# Patient Record
Sex: MALE | Race: BLACK OR AFRICAN AMERICAN | NOT HISPANIC OR LATINO | Employment: UNEMPLOYED | URBAN - METROPOLITAN AREA
[De-identification: names, ages, dates, MRNs, and addresses within clinical notes are randomized per-mention and may not be internally consistent; named-entity substitution may affect disease eponyms.]

---
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
## 2017-08-13 PROBLEM — D69.59 PLATELET DYSFUNCTION DUE TO DRUGS: Status: ACTIVE | Noted: 2017-08-13

## 2017-08-13 PROBLEM — T50.905A PLATELET DYSFUNCTION DUE TO DRUGS: Status: ACTIVE | Noted: 2017-08-13

## 2017-08-13 PROBLEM — I25.10 CAD (CORONARY ARTERY DISEASE): Status: ACTIVE | Noted: 2017-08-13

## 2017-08-15 PROBLEM — F10.10 ETOH ABUSE: Status: ACTIVE | Noted: 2017-08-15

## 2017-08-15 PROBLEM — S22.008A CLOSED FRACTURE OF SPINOUS PROCESS OF THORACIC VERTEBRA: Status: ACTIVE | Noted: 2017-08-15

## 2017-08-15 PROBLEM — F19.10 SUBSTANCE ABUSE: Status: ACTIVE | Noted: 2017-08-15

## 2017-08-17 PROBLEM — I10 HTN (HYPERTENSION): Status: ACTIVE | Noted: 2017-08-17

## 2017-08-17 PROBLEM — I25.2 H/O ACUTE MYOCARDIAL INFARCTION: Status: ACTIVE | Noted: 2017-08-17

## 2018-07-14 PROBLEM — I73.9 PAD (PERIPHERAL ARTERY DISEASE): Status: ACTIVE | Noted: 2018-07-14

## 2018-07-14 PROBLEM — R07.9 CHEST PAIN: Status: ACTIVE | Noted: 2018-07-14

## 2018-07-14 PROBLEM — M79.604 BILATERAL LEG PAIN: Status: ACTIVE | Noted: 2018-07-14

## 2018-07-14 PROBLEM — M79.605 BILATERAL LEG PAIN: Status: ACTIVE | Noted: 2018-07-14

## 2018-07-15 PROBLEM — I73.9 PVD (PERIPHERAL VASCULAR DISEASE) WITH CLAUDICATION: Status: ACTIVE | Noted: 2018-07-15

## 2018-07-15 PROBLEM — Z72.0 TOBACCO ABUSE: Status: ACTIVE | Noted: 2018-07-15

## 2018-07-15 PROBLEM — E78.5 HYPERLIPIDEMIA: Status: ACTIVE | Noted: 2018-07-15

## 2018-07-20 PROBLEM — L03.115 CELLULITIS OF RIGHT LOWER EXTREMITY WITHOUT FOOT: Status: ACTIVE | Noted: 2018-07-20

## 2018-07-21 PROBLEM — T14.8XXA HEMATOMA: Status: ACTIVE | Noted: 2018-07-21

## 2018-07-22 PROBLEM — R07.9 CHEST PAIN: Status: RESOLVED | Noted: 2018-07-14 | Resolved: 2018-07-22

## 2018-07-22 PROBLEM — I73.9 PAD (PERIPHERAL ARTERY DISEASE): Status: RESOLVED | Noted: 2018-07-14 | Resolved: 2018-07-22

## 2018-07-22 PROBLEM — M79.604 BILATERAL LEG PAIN: Status: RESOLVED | Noted: 2018-07-14 | Resolved: 2018-07-22

## 2018-07-22 PROBLEM — E78.5 HYPERLIPIDEMIA: Status: RESOLVED | Noted: 2018-07-15 | Resolved: 2018-07-22

## 2018-07-22 PROBLEM — M79.605 BILATERAL LEG PAIN: Status: RESOLVED | Noted: 2018-07-14 | Resolved: 2018-07-22

## 2018-07-24 ENCOUNTER — TELEPHONE (OUTPATIENT)
Dept: CARDIOLOGY | Facility: CLINIC | Age: 38
End: 2018-07-24

## 2018-07-24 ENCOUNTER — TELEPHONE (OUTPATIENT)
Dept: VASCULAR SURGERY | Facility: CLINIC | Age: 38
End: 2018-07-24

## 2018-07-24 NOTE — TELEPHONE ENCOUNTER
----- Message from Darby Moncada sent at 7/24/2018 12:29 PM CDT -----  Regarding: Ext Referral  Dx Fem pop. Referral from [facility]. Records should all be in media. St requesting a follow up appt. Please call pt the schedule. Thank you!

## 2018-07-24 NOTE — TELEPHONE ENCOUNTER
----- Message from Darby Moncada sent at 7/24/2018 12:21 PM CDT -----  Regarding: Ext Referral  Dx Fem pop. Please see records in [facility] already. Referral from [facility]. Please call pt to schedule appt on NS. Thank you!

## 2018-07-26 ENCOUNTER — TELEPHONE (OUTPATIENT)
Dept: CARDIOLOGY | Facility: CLINIC | Age: 38
End: 2018-07-26

## 2018-07-26 NOTE — TELEPHONE ENCOUNTER
Patient has been informed that we are no longer taking medicaid at this time and to follow up with his pcp.. He verbalized understanding

## 2018-07-26 NOTE — TELEPHONE ENCOUNTER
----- Message from Nydia Mcdonald sent at 7/26/2018 11:23 AM CDT -----  Contact: pt  .Type:  Patient Returning Call    Who Called:  Pt  Who Left Message for Patient:  Marie  Does the patient know what this is regarding?:  To schedule an appt with Dr Jaqueline Ivan Call Back Number:  [phone]  Additional Information: Called and IM the nurse no response

## 2018-08-02 ENCOUNTER — TELEPHONE (OUTPATIENT)
Dept: VASCULAR SURGERY | Facility: CLINIC | Age: 38
End: 2018-08-02

## 2018-08-02 NOTE — TELEPHONE ENCOUNTER
----- Message from Laurita García sent at 8/2/2018 12:33 PM CDT -----  Type: Needs Medical Advice    Who Called: patient  Symptoms (please be specific):  aicha  How long has patient had these symptoms:  Aicha  Pharmacy name and phone #:  [facility]  Best Call Back Number: [phone] (home)     Additional Information: Wanted to reschedule appt he had today at 10am, didn't realize had one scheduled today, post op

## 2018-08-19 PROBLEM — I10 PRIMARY HYPERTENSION: Status: ACTIVE | Noted: 2018-08-19

## 2018-08-19 PROBLEM — I70.90 ARTERY OCCLUSION: Status: ACTIVE | Noted: 2018-08-19

## 2018-08-20 PROBLEM — J96.01 ACUTE HYPOXEMIC RESPIRATORY FAILURE: Status: ACTIVE | Noted: 2018-08-20

## 2018-08-21 PROBLEM — D69.6 THROMBOCYTOPENIA: Status: ACTIVE | Noted: 2018-08-21

## 2018-08-24 PROBLEM — K56.7 ILEUS, POSTOPERATIVE: Status: ACTIVE | Noted: 2018-08-24

## 2018-08-24 PROBLEM — E63.9 INADEQUATE DIETARY ENERGY INTAKE: Status: ACTIVE | Noted: 2018-08-24

## 2018-08-24 PROBLEM — D62 ACUTE BLOOD LOSS ANEMIA: Status: ACTIVE | Noted: 2018-08-24

## 2018-08-24 PROBLEM — K91.89 ILEUS, POSTOPERATIVE: Status: ACTIVE | Noted: 2018-08-24

## 2018-09-01 PROBLEM — K56.7 ILEUS, POSTOPERATIVE: Status: RESOLVED | Noted: 2018-08-24 | Resolved: 2018-09-01

## 2018-09-01 PROBLEM — K91.89 ILEUS, POSTOPERATIVE: Status: RESOLVED | Noted: 2018-08-24 | Resolved: 2018-09-01

## 2018-09-01 PROBLEM — J96.01 ACUTE HYPOXEMIC RESPIRATORY FAILURE: Status: RESOLVED | Noted: 2018-08-20 | Resolved: 2018-09-01

## 2018-09-22 PROBLEM — K92.0 HEMATEMESIS: Status: ACTIVE | Noted: 2018-09-22

## 2018-11-30 PROBLEM — T82.898A: Status: ACTIVE | Noted: 2018-11-30

## 2018-12-10 ENCOUNTER — TELEPHONE (OUTPATIENT)
Dept: VASCULAR SURGERY | Facility: CLINIC | Age: 38
End: 2018-12-10

## 2018-12-10 NOTE — TELEPHONE ENCOUNTER
----- Message from Parveen Levine sent at 12/10/2018 11:34 AM CST -----  Contact: self   Patient want to know if you can change his appointment to 11:30 because of transportation please call back at [phone]

## 2018-12-10 NOTE — TELEPHONE ENCOUNTER
Post op appt scheduled 12/12 @ 1000, pt voices understanding and states he will call back as needed.

## 2018-12-10 NOTE — TELEPHONE ENCOUNTER
----- Message from Saji Phan sent at 12/10/2018  9:36 AM CST -----  Contact: Patient  Type: Needs Medical Advice    Who Called:  Patient  Best Call Back Number: [phone]  Additional Information: Patient would like to schedule hospital follow up. Please call to advise. Thanks!

## 2018-12-12 ENCOUNTER — OFFICE VISIT (OUTPATIENT)
Dept: VASCULAR SURGERY | Facility: CLINIC | Age: 38
End: 2018-12-12
Payer: MEDICAID

## 2018-12-12 VITALS
DIASTOLIC BLOOD PRESSURE: 84 MMHG | SYSTOLIC BLOOD PRESSURE: 134 MMHG | HEART RATE: 74 BPM | BODY MASS INDEX: 22.77 KG/M2 | HEIGHT: 65 IN | WEIGHT: 136.69 LBS

## 2018-12-12 DIAGNOSIS — I73.9 PAD (PERIPHERAL ARTERY DISEASE): Primary | ICD-10-CM

## 2018-12-12 PROCEDURE — 99024 POSTOP FOLLOW-UP VISIT: CPT | Mod: ,,, | Performed by: THORACIC SURGERY (CARDIOTHORACIC VASCULAR SURGERY)

## 2018-12-12 PROCEDURE — 99213 OFFICE O/P EST LOW 20 MIN: CPT | Mod: PBBFAC,PO | Performed by: THORACIC SURGERY (CARDIOTHORACIC VASCULAR SURGERY)

## 2018-12-12 PROCEDURE — 99999 PR PBB SHADOW E&M-EST. PATIENT-LVL III: CPT | Mod: PBBFAC,,, | Performed by: THORACIC SURGERY (CARDIOTHORACIC VASCULAR SURGERY)

## 2018-12-12 RX ORDER — NITROGLYCERIN 0.4 MG/1
TABLET SUBLINGUAL
Refills: 0 | COMMUNITY
Start: 2018-10-25 | End: 2021-02-08

## 2018-12-12 RX ORDER — CLOPIDOGREL BISULFATE 75 MG/1
75 TABLET ORAL DAILY
Refills: 0 | COMMUNITY
Start: 2018-10-25 | End: 2019-02-13 | Stop reason: SDUPTHER

## 2018-12-12 RX ORDER — CARVEDILOL 6.25 MG/1
25 TABLET ORAL DAILY
Refills: 0 | COMMUNITY
Start: 2018-10-25 | End: 2019-02-13 | Stop reason: SDUPTHER

## 2018-12-12 NOTE — PROGRESS NOTES
OPERATIVE REPORT    INDICATIONS:  Mr. Edwards occluded his right hmbafzz-se-fxfebllnn artery bypass   graft again.  I performed thrombectomy with revision of the distal graft and   creation of a new interposition bypass with the distal anastomosis to the   proximal posterior tibial artery.  Part of the anastomosis is on the   tibioperoneal trunk.  He states that the foot is feeling well, although he has   some edema.  There was a drain placed in the popliteal space and he states that   he is draining clear fluid.    On physical examination:  The incisions are healing well.  His foot is very   warm.  His graft is patent.  There is clear fluid coming from the drain site,   which has not healed and this seems like a lymphatic fluid.    I asked him to keep a dressing on it and elevate the legs as much as possible.    At some point if it does not stop, we will have to probably explore and oversew   lymphatics.  For now, we will treat this conservatively.  Staples are removed.    He will return to see me in three months.      VIOLETTE  dd: 12/12/2018 12:32:04 (CST)  td: 12/13/2018 05:12:26 (CST)  Doc ID   #[id number]  Job ID #[id number]    CC:

## 2018-12-14 DIAGNOSIS — I73.9 PAD (PERIPHERAL ARTERY DISEASE): Primary | ICD-10-CM

## 2019-01-24 PROBLEM — I99.8 LOWER LIMB ISCHEMIA: Status: ACTIVE | Noted: 2019-01-24

## 2019-01-27 PROBLEM — I70.321: Status: ACTIVE | Noted: 2019-01-27

## 2019-01-27 PROBLEM — I70.229 ATHEROSCLEROTIC PERIPHERAL VASCULAR DISEASE WITH REST PAIN: Status: ACTIVE | Noted: 2019-01-27

## 2019-01-28 PROBLEM — I70.90 ARTERIAL OCCLUSION: Status: ACTIVE | Noted: 2019-01-28

## 2019-06-26 PROBLEM — I99.8 ISCHEMIC LEG: Status: ACTIVE | Noted: 2019-06-26

## 2019-07-15 ENCOUNTER — TELEPHONE (OUTPATIENT)
Dept: VASCULAR SURGERY | Facility: CLINIC | Age: 39
End: 2019-07-15

## 2019-07-15 NOTE — TELEPHONE ENCOUNTER
----- Message from Radha Borden sent at 7/15/2019  1:02 PM CDT -----  Contact: pt  ..Type:  Apoointment Request    .    Name of Caller:  pt  Symptoms:  F/u from surgery  Best Call Back Number:   Additional Information:  Pt stated he had surgery on 7-3-19, needs to schedule a f/u appt with Dr Smalls, pt misplaced paperwork not sure when he is suppose to f/u  Please advise  Thanks

## 2019-07-16 NOTE — TELEPHONE ENCOUNTER
Nurse visit for staple removal on Wed, 7/24 @ 10 am.  Post-op with Dr Bowles rescheduled from 8/1 to 8/15 @ 2:30 pm.  Voices understanding and is agreeable.

## 2019-07-16 NOTE — TELEPHONE ENCOUNTER
----- Message from Coty Celis sent at 7/16/2019 10:32 AM CDT -----  Contact: pt  Pt missed a call and would the nurse to return their call.    Pt can be reached at [phone]

## 2019-07-16 NOTE — TELEPHONE ENCOUNTER
----- Message from Kandy Arteaga sent at 7/16/2019  9:32 AM CDT -----  Contact: patient  Type: Needs Medical Advice    Who Called:  patient  Symptoms (please be specific):  na  How long has patient had these symptoms:  qiana  Pharmacy name and phone #:  [facility]  Best Call Back Number: [phone]  Additional Information: Patient wants to know when does he get his staples removed.  Please call to advise. Thanks!

## 2019-07-16 NOTE — TELEPHONE ENCOUNTER
Left message on voicemail requesting a call back to coordinate nurse visit date and time for staple removal.

## 2021-01-28 ENCOUNTER — TELEPHONE (OUTPATIENT)
Dept: VASCULAR SURGERY | Facility: CLINIC | Age: 41
End: 2021-01-28

## 2021-02-01 ENCOUNTER — TELEPHONE (OUTPATIENT)
Dept: VASCULAR SURGERY | Facility: CLINIC | Age: 41
End: 2021-02-01

## 2021-02-04 ENCOUNTER — TELEPHONE (OUTPATIENT)
Dept: VASCULAR SURGERY | Facility: CLINIC | Age: 41
End: 2021-02-04

## 2021-02-11 ENCOUNTER — OFFICE VISIT (OUTPATIENT)
Dept: VASCULAR SURGERY | Facility: CLINIC | Age: 41
End: 2021-02-11
Payer: MEDICAID

## 2021-02-11 DIAGNOSIS — Z48.89 ENCOUNTER FOR POST SURGICAL WOUND CHECK: Primary | ICD-10-CM

## 2021-02-11 PROCEDURE — 99212 OFFICE O/P EST SF 10 MIN: CPT | Mod: S$PBB,,, | Performed by: THORACIC SURGERY (CARDIOTHORACIC VASCULAR SURGERY)

## 2021-02-11 PROCEDURE — 99213 OFFICE O/P EST LOW 20 MIN: CPT | Mod: PBBFAC,PO | Performed by: THORACIC SURGERY (CARDIOTHORACIC VASCULAR SURGERY)

## 2021-02-11 PROCEDURE — 99212 PR OFFICE/OUTPT VISIT, EST, LEVL II, 10-19 MIN: ICD-10-PCS | Mod: S$PBB,,, | Performed by: THORACIC SURGERY (CARDIOTHORACIC VASCULAR SURGERY)

## 2021-02-11 PROCEDURE — 99999 PR PBB SHADOW E&M-EST. PATIENT-LVL III: ICD-10-PCS | Mod: PBBFAC,,, | Performed by: THORACIC SURGERY (CARDIOTHORACIC VASCULAR SURGERY)

## 2021-02-11 PROCEDURE — 99999 PR PBB SHADOW E&M-EST. PATIENT-LVL III: CPT | Mod: PBBFAC,,, | Performed by: THORACIC SURGERY (CARDIOTHORACIC VASCULAR SURGERY)

## 2024-01-06 PROBLEM — I47.20 VT (VENTRICULAR TACHYCARDIA): Status: ACTIVE | Noted: 2024-01-06

## 2024-01-06 PROBLEM — I24.9 ACS (ACUTE CORONARY SYNDROME): Status: ACTIVE | Noted: 2024-01-06

## 2024-01-06 PROBLEM — I46.9 CARDIAC ARREST: Status: ACTIVE | Noted: 2024-01-06

## 2024-01-07 PROBLEM — I21.11 ACUTE ST ELEVATION MYOCARDIAL INFARCTION (STEMI) INVOLVING RIGHT CORONARY ARTERY: Status: ACTIVE | Noted: 2024-01-07

## 2024-01-07 PROBLEM — K92.2 GIB (GASTROINTESTINAL BLEEDING): Status: ACTIVE | Noted: 2024-01-07

## 2024-01-07 PROBLEM — R56.9 WITNESSED SEIZURE-LIKE ACTIVITY: Status: ACTIVE | Noted: 2024-01-07

## 2024-01-07 PROBLEM — G93.40 ENCEPHALOPATHY ACUTE: Status: ACTIVE | Noted: 2024-01-07

## 2024-01-07 PROBLEM — I42.9 CARDIOMYOPATHY: Status: ACTIVE | Noted: 2024-01-07

## 2024-01-09 PROBLEM — E16.2 HYPOGLYCEMIA: Status: ACTIVE | Noted: 2024-01-09

## 2024-01-15 PROBLEM — E87.6 ACUTE HYPOKALEMIA: Status: ACTIVE | Noted: 2024-01-15

## 2024-01-16 PROBLEM — R50.9 FEBRILE ILLNESS: Status: ACTIVE | Noted: 2024-01-16

## 2024-01-17 PROBLEM — G93.1 ANOXIC BRAIN INJURY: Status: ACTIVE | Noted: 2024-01-07

## 2024-01-18 PROBLEM — E87.0 HYPERNATREMIA: Status: ACTIVE | Noted: 2024-01-18

## 2024-01-20 PROBLEM — E16.2 HYPOGLYCEMIA: Status: RESOLVED | Noted: 2024-01-09 | Resolved: 2024-01-20

## 2024-01-20 PROBLEM — E87.6 ACUTE HYPOKALEMIA: Status: RESOLVED | Noted: 2024-01-15 | Resolved: 2024-01-20

## 2024-01-22 PROBLEM — K92.2 GIB (GASTROINTESTINAL BLEEDING): Status: RESOLVED | Noted: 2024-01-07 | Resolved: 2024-01-22

## 2024-01-22 PROBLEM — E87.0 HYPERNATREMIA: Status: RESOLVED | Noted: 2024-01-18 | Resolved: 2024-01-22

## 2024-01-22 PROBLEM — R50.9 FEBRILE ILLNESS: Status: RESOLVED | Noted: 2024-01-16 | Resolved: 2024-01-22

## 2024-01-23 PROBLEM — R40.3 VEGETATIVE STATE: Status: ACTIVE | Noted: 2024-01-23

## 2024-02-01 PROBLEM — Z51.5 COMFORT MEASURES ONLY STATUS: Status: ACTIVE | Noted: 2024-02-01
